# Patient Record
Sex: MALE | Race: WHITE | HISPANIC OR LATINO | Employment: STUDENT | URBAN - METROPOLITAN AREA
[De-identification: names, ages, dates, MRNs, and addresses within clinical notes are randomized per-mention and may not be internally consistent; named-entity substitution may affect disease eponyms.]

---

## 2018-07-02 ENCOUNTER — OFFICE VISIT (OUTPATIENT)
Dept: INTERNAL MEDICINE | Facility: CLINIC | Age: 23
End: 2018-07-02
Payer: COMMERCIAL

## 2018-07-02 ENCOUNTER — LAB VISIT (OUTPATIENT)
Dept: LAB | Facility: HOSPITAL | Age: 23
End: 2018-07-02
Attending: INTERNAL MEDICINE
Payer: COMMERCIAL

## 2018-07-02 VITALS
SYSTOLIC BLOOD PRESSURE: 100 MMHG | HEART RATE: 69 BPM | WEIGHT: 181.44 LBS | BODY MASS INDEX: 29.16 KG/M2 | HEIGHT: 66 IN | RESPIRATION RATE: 16 BRPM | DIASTOLIC BLOOD PRESSURE: 70 MMHG | TEMPERATURE: 99 F

## 2018-07-02 DIAGNOSIS — Z00.00 ANNUAL PHYSICAL EXAM: ICD-10-CM

## 2018-07-02 DIAGNOSIS — Z00.00 ANNUAL PHYSICAL EXAM: Primary | ICD-10-CM

## 2018-07-02 LAB
ALBUMIN SERPL BCP-MCNC: 4.5 G/DL
ALP SERPL-CCNC: 92 U/L
ALT SERPL W/O P-5'-P-CCNC: 24 U/L
ANION GAP SERPL CALC-SCNC: 9 MMOL/L
AST SERPL-CCNC: 21 U/L
BASOPHILS # BLD AUTO: 0.05 K/UL
BASOPHILS NFR BLD: 1.2 %
BILIRUB SERPL-MCNC: 1.1 MG/DL
BUN SERPL-MCNC: 7 MG/DL
CALCIUM SERPL-MCNC: 10.3 MG/DL
CHLORIDE SERPL-SCNC: 104 MMOL/L
CHOLEST SERPL-MCNC: 176 MG/DL
CHOLEST/HDLC SERPL: 4.1 {RATIO}
CO2 SERPL-SCNC: 27 MMOL/L
CREAT SERPL-MCNC: 0.8 MG/DL
DIFFERENTIAL METHOD: ABNORMAL
EOSINOPHIL # BLD AUTO: 0.5 K/UL
EOSINOPHIL NFR BLD: 11.8 %
ERYTHROCYTE [DISTWIDTH] IN BLOOD BY AUTOMATED COUNT: 12.6 %
EST. GFR  (AFRICAN AMERICAN): >60 ML/MIN/1.73 M^2
EST. GFR  (NON AFRICAN AMERICAN): >60 ML/MIN/1.73 M^2
GLUCOSE SERPL-MCNC: 80 MG/DL
HCT VFR BLD AUTO: 46 %
HDLC SERPL-MCNC: 43 MG/DL
HDLC SERPL: 24.4 %
HGB BLD-MCNC: 15.4 G/DL
IMM GRANULOCYTES # BLD AUTO: 0 K/UL
IMM GRANULOCYTES NFR BLD AUTO: 0 %
LDLC SERPL CALC-MCNC: 110.4 MG/DL
LYMPHOCYTES # BLD AUTO: 1.6 K/UL
LYMPHOCYTES NFR BLD: 39.2 %
MCH RBC QN AUTO: 28.1 PG
MCHC RBC AUTO-ENTMCNC: 33.5 G/DL
MCV RBC AUTO: 84 FL
MONOCYTES # BLD AUTO: 0.4 K/UL
MONOCYTES NFR BLD: 10 %
NEUTROPHILS # BLD AUTO: 1.5 K/UL
NEUTROPHILS NFR BLD: 37.8 %
NONHDLC SERPL-MCNC: 133 MG/DL
NRBC BLD-RTO: 0 /100 WBC
PLATELET # BLD AUTO: 178 K/UL
PMV BLD AUTO: 11.9 FL
POTASSIUM SERPL-SCNC: 4.6 MMOL/L
PROT SERPL-MCNC: 7.6 G/DL
RBC # BLD AUTO: 5.49 M/UL
SODIUM SERPL-SCNC: 140 MMOL/L
TRIGL SERPL-MCNC: 113 MG/DL
TSH SERPL DL<=0.005 MIU/L-ACNC: 0.93 UIU/ML
WBC # BLD AUTO: 4.08 K/UL

## 2018-07-02 PROCEDURE — 36415 COLL VENOUS BLD VENIPUNCTURE: CPT | Mod: PO

## 2018-07-02 PROCEDURE — 99385 PREV VISIT NEW AGE 18-39: CPT | Mod: S$GLB,,, | Performed by: INTERNAL MEDICINE

## 2018-07-02 PROCEDURE — 85025 COMPLETE CBC W/AUTO DIFF WBC: CPT

## 2018-07-02 PROCEDURE — 84443 ASSAY THYROID STIM HORMONE: CPT

## 2018-07-02 PROCEDURE — 80061 LIPID PANEL: CPT

## 2018-07-02 PROCEDURE — 80053 COMPREHEN METABOLIC PANEL: CPT

## 2018-07-02 PROCEDURE — 99999 PR PBB SHADOW E&M-NEW PATIENT-LVL III: CPT | Mod: PBBFAC,,, | Performed by: INTERNAL MEDICINE

## 2018-07-02 NOTE — PROGRESS NOTES
Subjective:       Patient ID: Victor M Henson Jr. is a 23 y.o. male.    Chief Complaint: Annual Exam    HPI     23 y.o. male here for annual exam.     Cholesterol: needs  Vaccines: Influenza - done; Tetanus - done 1 yr ago  Sexual Screening: not active  STD screening: not active  Eye exam: done last 1 yr ago  Prostate: no family history of cancer  Colonoscopy: no family history of cancer    Exercise: weight lifting - on and off.  Diet:  A lot better.  More water and eating out less.    He is going to another university, but has financial worries.  He is going to [employer].  He is studying computer science.  He is enrolled now.  He is an advanced sophomore.    History reviewed. No pertinent past medical history.  History reviewed. No pertinent surgical history.  Social History     Social History    Marital status: Single     Spouse name: N/A    Number of children: N/A    Years of education: N/A     Occupational History    Not on file.     Social History Main Topics    Smoking status: Never Smoker    Smokeless tobacco: Never Used    Alcohol use No    Drug use: No    Sexual activity: Not Currently     Other Topics Concern    Not on file     Social History Narrative    No narrative on file     Review of patient's allergies indicates:  No Known Allergies  Mr. Henson had no medications administered during this visit.    Review of Systems   Constitutional: Negative for chills, fever and unexpected weight change.   HENT: Negative for congestion, postnasal drip and sore throat.    Eyes: Negative for redness and visual disturbance.   Respiratory: Negative for cough and shortness of breath.    Cardiovascular: Negative for chest pain and palpitations.   Gastrointestinal: Negative for abdominal pain, constipation, diarrhea, nausea and vomiting.   Genitourinary: Negative for dysuria, frequency and hematuria.   Musculoskeletal: Negative for arthralgias and myalgias.   Skin: Negative for color change and rash.   Neurological:  Negative for dizziness and headaches.       Objective:      Physical Exam   Constitutional: He is oriented to person, place, and time. He appears well-developed and well-nourished.   HENT:   Head: Normocephalic and atraumatic.   Mouth/Throat: No oropharyngeal exudate.   Eyes: EOM are normal. Pupils are equal, round, and reactive to light. Right eye exhibits no discharge. Left eye exhibits no discharge. No scleral icterus.   Neck: Normal range of motion. Neck supple. No tracheal deviation present. No thyromegaly present.   Cardiovascular: Normal rate, regular rhythm and normal heart sounds.  Exam reveals no gallop and no friction rub.    No murmur heard.  Pulmonary/Chest: Effort normal and breath sounds normal. No respiratory distress. He has no wheezes. He has no rales. He exhibits no tenderness.   Abdominal: Soft. Bowel sounds are normal. He exhibits no distension and no mass. There is no tenderness. There is no rebound and no guarding.   Musculoskeletal: Normal range of motion. He exhibits no edema or tenderness.   Neurological: He is alert and oriented to person, place, and time.   Skin: Skin is warm and dry. No rash noted. No erythema. No pallor.   Psychiatric: He has a normal mood and affect. His behavior is normal.   Vitals reviewed.      Assessment:       1. Annual physical exam        Plan:       1.  Check CBC, CMP, TSH, lipids.  Discussed exercise with patient.  Up-to-date on vaccines.  No need for colon cancer or prostate cancer screening at this time.    Return to clinic in 1 year or sooner if needed.

## 2024-05-10 ENCOUNTER — HOSPITAL ENCOUNTER (EMERGENCY)
Facility: HOSPITAL | Age: 29
Discharge: HOME | End: 2024-05-10
Attending: EMERGENCY MEDICINE
Payer: MEDICAID

## 2024-05-10 VITALS
HEART RATE: 89 BPM | HEIGHT: 66 IN | SYSTOLIC BLOOD PRESSURE: 151 MMHG | TEMPERATURE: 99 F | RESPIRATION RATE: 14 BRPM | BODY MASS INDEX: 33.91 KG/M2 | OXYGEN SATURATION: 96 % | DIASTOLIC BLOOD PRESSURE: 96 MMHG | WEIGHT: 211 LBS

## 2024-05-10 DIAGNOSIS — T78.40XA ALLERGIC REACTION, INITIAL ENCOUNTER: Primary | ICD-10-CM

## 2024-05-10 PROCEDURE — 99284 EMERGENCY DEPT VISIT MOD MDM: CPT

## 2024-05-10 RX ORDER — EPINEPHRINE 0.3 MG/.3ML
1 INJECTION SUBCUTANEOUS ONCE AS NEEDED
Qty: 0.3 ML | Refills: 3 | Status: SHIPPED | OUTPATIENT
Start: 2024-05-10

## 2024-05-10 RX ORDER — EPINEPHRINE 0.3 MG/.3ML
1 INJECTION SUBCUTANEOUS ONCE
Qty: 2 EACH | Refills: 3 | Status: SHIPPED | OUTPATIENT
Start: 2024-05-10 | End: 2024-05-11

## 2024-05-10 RX ORDER — EPINEPHRINE 0.3 MG/.3ML
2 INJECTION SUBCUTANEOUS ONCE
Qty: 0.6 ML | Refills: 2 | Status: SHIPPED | OUTPATIENT
Start: 2024-05-10 | End: 2024-05-10 | Stop reason: SDUPTHER

## 2024-05-10 RX ORDER — EPINEPHRINE 0.3 MG/.3ML
2 INJECTION SUBCUTANEOUS ONCE
Qty: 0.6 ML | Refills: 0 | Status: SHIPPED | OUTPATIENT
Start: 2024-05-10 | End: 2024-05-10

## 2024-05-10 NOTE — ED PROVIDER NOTES
Encounter Date: 5/10/2024       History     Chief Complaint   Patient presents with    Allergic Reaction     Pt is allergic to spinach. He was exposed approximately 30 minutes ago and is throat tightness and chest pain. He has not taken any meds.      29-year-old male who denies any significant past medical history is presenting to the emergency department for reported allergic reaction from accidentally eating spinach today.  Patient states he has allergy to has been as it causes him to vomit and he feels some throat swelling sensation.  He did not have any nausea or vomiting today.  He did report some sensation that he was having swelling in his throat, this has since resolved.  Currently feeling improved.  States that he has never had anaphylaxis and does not have an EpiPen.  Denies urticaria, shortness of breath, wheeze.  He has otherwise in normal state of health.    The history is provided by the patient. No  was used.     Review of patient's allergies indicates:  No Known Allergies  History reviewed. No pertinent past medical history.  History reviewed. No pertinent surgical history.  Family History   Problem Relation Name Age of Onset    Diabetes Father      Cancer Neg Hx      Stroke Neg Hx      Hypertension Neg Hx      Hyperlipidemia Neg Hx      Heart disease Neg Hx       Social History     Tobacco Use    Smoking status: Never    Smokeless tobacco: Never   Substance Use Topics    Alcohol use: No    Drug use: No       Physical Exam     Initial Vitals [05/10/24 1337]   BP Pulse Resp Temp SpO2   (!) 178/114 102 15 99 °F (37.2 °C) 98 %      MAP       --         Physical Exam    Nursing note and vitals reviewed.  Constitutional: He is not diaphoretic. No distress.   HENT:   Head: Normocephalic and atraumatic.   Mouth/Throat: Oropharynx is clear and moist.   No edema of the posterior oropharynx.  Speech is clear.   Neck: Neck supple.   Normal range of motion.  Cardiovascular:  Normal rate and  regular rhythm.           Pulmonary/Chest: Breath sounds normal. No respiratory distress. He has no wheezes. He has no rhonchi. He has no rales.   Abdominal: Abdomen is soft. He exhibits no distension. There is no abdominal tenderness. There is no rebound and no guarding.   Musculoskeletal:         General: No edema.      Cervical back: Normal range of motion and neck supple.     Neurological: He is alert.   Skin: Skin is warm and dry. No rash noted.   No urticaria   Psychiatric: He has a normal mood and affect. Thought content normal.         ED Course   Procedures  Labs Reviewed - No data to display       Imaging Results    None          Medications - No data to display  Medical Decision Making  29-year-old male who denies any significant past medical history is presenting to the emergency department for reported allergic reaction from accidentally eating spinach today.  Normal airway on arrival.  No history of anaphylaxis.  Patient does not have an EpiPen.  Will discharge home with EpiPen and instructions on how to use.  Patient does not have urticaria, nausea or vomiting.  Lungs are clear.  Patient is hypertensive, this was discussed as he needs to follow up with his primary care doctor for recheck and to possibly start medications.  No evidence of anaphylaxis today.  Strict return precautions were discussed.    Risk  Prescription drug management.               ED Course as of 05/10/24 1509   Fri May 10, 2024   1500 Patient was instructed to follow up with his primary care physician regarding his elevated blood pressure today. [KL]   1501 All results were discussed with patient. Strict ED precautions and return instructions were discussed. All questions were answered. Instructed to follow up with primary care doctor for re-evaluation. Stable for discharge and outpatient follow up.   [KL]      ED Course User Index  [KL] Snehal Chung MD                     Clinical Impression:  Final diagnoses:  [T78.40XA]  Allergic reaction, initial encounter (Primary)          ED Disposition Condition    Discharge Stable          ED Prescriptions       Medication Sig Dispense Start Date End Date Auth. Provider    EPINEPHrine (EPIPEN) 0.3 mg/0.3 mL AtIn  (Status: Discontinued) Inject 0.6 mLs (0.6 mg total) into the muscle once. for 1 dose 0.6 mL 5/10/2024 5/10/2024 Snehal Chung MD    EPINEPHrine (EPIPEN) 0.3 mg/0.3 mL AtIn  (Status: Discontinued) Inject 0.6 mLs (0.6 mg total) into the muscle once. for 1 dose 0.6 mL 5/10/2024 5/10/2024 Snehal Chung MD    EPINEPHrine (EPIPEN) 0.3 mg/0.3 mL AtIn (Expires today) Inject 0.3 mLs (0.3 mg total) into the muscle once. for 1 dose 0.3 mL 5/10/2024 5/10/2024 Snehal Chung MD          Follow-up Information       Follow up With Specialties Details Why Contact [facility] - Emergency Dept Emergency Medicine Go to  As needed, If symptoms worsen [address] [address] [phone]    Isidro Reis MD Internal Medicine Schedule an appointment as soon as possible for a visit in 2 days  [address] [facility] [address]  [phone]               Snehal Chung MD  Resident  05/10/24 9406

## 2024-05-10 NOTE — DISCHARGE INSTRUCTIONS
Diagnosis: allergic reaction    Tests today showed:   Labs Reviewed - No data to display  No orders to display       Treatments you had today:   Medications - No data to display    Follow-Up Plan:  - Follow-up with primary care doctor within 3 - 5 days  - Additional testing and/or evaluation as directed by your primary doctor    Return to the Emergency Department for symptoms including but not limited to: worsening symptoms, shortness of breath or chest pain, vomiting with inability to hold down fluids, fevers greater than 100.4°F, passing out/fainting/unconsciousness, or other concerning symptoms.

## 2024-05-10 NOTE — ED NOTES
Respirations are spontaneous and unlabored. Airway is open and patent. No signs of swelling noted.

## 2024-05-10 NOTE — Clinical Note
"Victor M Song" Sixto was seen and treated in our emergency department on 5/10/2024.  He may return to work on 05/13/2024.       If you have any questions or concerns, please don't hesitate to call.      Snehal Chung MD"

## 2024-05-10 NOTE — PROVIDER PROGRESS NOTES - EMERGENCY DEPT.
Encounter Date: 5/10/2024    ED Physician Progress Notes          Physician Attestation Statement for Resident:  As the supervising MD   Physician Attestation Statement: I have personally seen and examined this patient.       I agree with the history unless otherwise noted.     As the supervising MD I agree with the PE unless otherwise noted.      I have reviewed and agree with the residents interpretation of the following unless otherwise noted:     As the supervising MD I agree with the treatment, course, plan, and disposition unless otherwise noted.

## 2024-05-10 NOTE — ED TRIAGE NOTES
Pt presents to the ED following an allergic reaction to spinach. Pt states he ate a sandwich with spinach on it which caused his throat to swell and chest to ache. Pt states that the chest aches have almost fully subsided and his throat swelling has decreased. Pt states he has never had an anaphylactic reaction and does not carry an epi-pen. Pt states that he did not have a rash with the reaction

## 2024-05-14 ENCOUNTER — TELEPHONE (OUTPATIENT)
Dept: INTERNAL MEDICINE | Facility: CLINIC | Age: 29
End: 2024-05-14
Payer: MEDICAID

## 2024-08-16 ENCOUNTER — OFFICE VISIT (OUTPATIENT)
Dept: URGENT CARE | Facility: CLINIC | Age: 29
End: 2024-08-16
Payer: COMMERCIAL

## 2024-08-16 VITALS
TEMPERATURE: 103 F | SYSTOLIC BLOOD PRESSURE: 114 MMHG | WEIGHT: 211 LBS | HEART RATE: 109 BPM | DIASTOLIC BLOOD PRESSURE: 78 MMHG | BODY MASS INDEX: 33.91 KG/M2 | HEIGHT: 66 IN | OXYGEN SATURATION: 96 % | RESPIRATION RATE: 18 BRPM

## 2024-08-16 DIAGNOSIS — R50.81 FEVER IN OTHER DISEASES: Primary | ICD-10-CM

## 2024-08-16 DIAGNOSIS — J06.9 VIRAL URI: ICD-10-CM

## 2024-08-16 DIAGNOSIS — U07.1 COVID-19 VIRUS DETECTED: ICD-10-CM

## 2024-08-16 DIAGNOSIS — U07.1 COVID: ICD-10-CM

## 2024-08-16 LAB
CTP QC/QA: YES
SARS-COV-2 AG RESP QL IA.RAPID: POSITIVE

## 2024-08-16 RX ORDER — ACETAMINOPHEN 500 MG
1000 TABLET ORAL
Status: COMPLETED | OUTPATIENT
Start: 2024-08-16 | End: 2024-08-16

## 2024-08-16 RX ORDER — PROMETHAZINE HYDROCHLORIDE AND DEXTROMETHORPHAN HYDROBROMIDE 6.25; 15 MG/5ML; MG/5ML
5 SYRUP ORAL NIGHTLY PRN
Qty: 35 ML | Refills: 0 | Status: SHIPPED | OUTPATIENT
Start: 2024-08-16

## 2024-08-16 RX ADMIN — Medication 1000 MG: at 04:08

## 2024-08-16 NOTE — PROGRESS NOTES
"Subjective:      Patient ID: Victor M Henson Jr. is a 29 y.o. male.    Vitals:  height is 5' 6" (1.676 m) and weight is 95.7 kg (211 lb). His oral temperature is 102.7 °F (39.3 °C) (abnormal). His blood pressure is 114/78 and his pulse is 109. His respiration is 18 and oxygen saturation is 96%.     Chief Complaint: Feels unwell    Patient presents c.o. fatigue. Symptoms include sore throat and nasal congestion. Symptoms began yesterday. Patient has not taken anything for his symptoms.    Fatigue  The current episode started yesterday. The problem occurs constantly. Associated symptoms include congestion, coughing (rare and not bothersome), fatigue, a sore throat and weakness. Pertinent negatives include no abdominal pain, anorexia, arthralgias, chest pain, chills, diaphoresis, fever, joint swelling, myalgias, nausea, neck pain, numbness, swollen glands, urinary symptoms, vertigo, visual change or vomiting. Nothing aggravates the symptoms. He has tried nothing for the symptoms.       Constitution: Positive for fatigue. Negative for chills, sweating and fever.   HENT:  Positive for congestion and sore throat.    Neck: Negative for neck pain.   Cardiovascular:  Negative for chest pain.   Respiratory:  Positive for cough (rare and not bothersome).    Gastrointestinal:  Negative for abdominal pain, nausea and vomiting.   Musculoskeletal:  Negative for joint pain, joint swelling and muscle ache.   Neurological:  Negative for history of vertigo and numbness.      Objective:     Vitals:    08/16/24 1623   BP: 114/78   BP Location: Left arm   Patient Position: Sitting   BP Method: Large (Automatic)   Pulse: 109   Resp: 18   Temp: (!) 102.7 °F (39.3 °C)   TempSrc: Oral   SpO2: 96%   Weight: 95.7 kg (211 lb)   Height: 5' 6" (1.676 m)      Physical Exam   Constitutional: He is oriented to person, place, and time.  Non-toxic appearance. He does not appear ill. No distress.   HENT:   Head: Atraumatic.   Ears:   Right Ear: Tympanic " membrane normal.   Left Ear: Tympanic membrane normal.   Eyes: Conjunctivae are normal.   Neck: Neck supple.   Cardiovascular: Normal rate, regular rhythm, normal heart sounds and normal pulses.   Pulmonary/Chest: Effort normal and breath sounds normal.   Neurological: no focal deficit. He is alert and oriented to person, place, and time.   Skin: Skin is not diaphoretic. Capillary refill takes less than 2 seconds.   Psychiatric: Judgment and thought content normal.     Results for orders placed or performed in visit on 08/16/24   SARS Coronavirus 2 Antigen, POCT Manual Read   Result Value Ref Range    SARS Coronavirus 2 Antigen Positive (A) Negative     Acceptable Yes       Assessment:     1. Fever in other diseases    2. Viral URI    3. COVID        Plan:       Fever in other diseases  -     SARS Coronavirus 2 Antigen, POCT Manual Read  -     acetaminophen tablet 1,000 mg    2. Viral URI  -     promethazine-dextromethorphan (PROMETHAZINE-DM) 6.25-15 mg/5 mL Syrp; Take 5 mLs by mouth nightly as needed (cough, congestion). This medication can make you feel drowsy. Take if bothersome cough.  Dispense: 35 mL; Refill: 0    3. COVID  Low risk. COVID risk score 2. Discussed indications/ side effects/ risk and benefits of Paxlovid. Patient elects for supportive care and this is reasonable.    Patient Instructions   Below are suggestions for symptomatic relief of your upper respiratory symptoms:              -Salt water gargles to soothe throat pain.              -Chloroseptic spray and Cepacol lozenges also help to numb throat pain.              -Warm herbal teas with honey/lemon/ginger can help soothe sore throat and hoarseness              -Nasal saline spray reduces inflammation and dryness.              -Warm face compresses to help with facial sinus pain/pressure.              -Humidifiers and steam can help with nasal dryness and congestion              -Vicks vapor rub at night for chest congestion.               -Flonase OTC or Nasacort OTC for nasal congestion and post-nasal drip. Ok to use twice daily for the first week, then reduce to once daily after symptoms have begun to improve.              -Afrin is a nasal spray that can give immediate relief of nasal congestion but you cannot use this medication for more than 3 days              -Simple foods like chicken noodle soup.              - Mucinex for congestion or Mucinex DM for cough during the day time. Delsym helps with coughing at night. Mucinex-D if you have sinus pressure/sinus pain or chest congestion. (caution if history of high blood pressure or palpitations). You must increase your water intake when using expectorants (Mucinex).             -Zyrtec/Claritin/Allegra/Xyzal should help with allergies.  -If you DO NOT have Hypertension or any history of palpitations, it is ok to take over the counter Sudafed or Mucinex D or Allegra-D or Claritin-D or Zyrtec-D.  -If you do take one of the above, it is ok to combine that with plain over the counter Mucinex or Allegra or Claritin or Zyrtec. If, for example, you are taking Zyrtec -D, you can combine that with Mucinex, but not Mucinex-D.  If you are taking Mucinex-D, you can combine that with plain Allegra or Claritin or Zyrtec.   -If you DO have Hypertension or palpitations, it is safe to take Coricidin HBP for relief of sinus symptoms.     Stay hydrated.    Tylenol/ ibuprofen as needed.    If you test positive for COVID-19 you may return to normal activities when, for at least 24 hours, both are true:    Your symptoms are getting better overall, and:  You have not had a fever AND are not using fever reducing medication    The CDC also recommends added precautions in the 5 days after return to normal activity including frequent hand washing, mask wearing, physical distancing.      Follow up with primary care provider in 5-7 days.    Seek immediate care in the emergency room in the event of severe abdominal  pain, chest pain, respiratory distress, fever unresponsive to antipyretic, dehydration, loss of consciousness, seizure.

## 2024-08-16 NOTE — PATIENT INSTRUCTIONS
Below are suggestions for symptomatic relief of your upper respiratory symptoms:              -Salt water gargles to soothe throat pain.              -Chloroseptic spray and Cepacol lozenges also help to numb throat pain.              -Warm herbal teas with honey/lemon/quinten can help soothe sore throat and hoarseness              -Nasal saline spray reduces inflammation and dryness.              -Warm face compresses to help with facial sinus pain/pressure.              -Humidifiers and steam can help with nasal dryness and congestion              -Vicks vapor rub at night for chest congestion.              -Flonase OTC or Nasacort OTC for nasal congestion and post-nasal drip. Ok to use twice daily for the first week, then reduce to once daily after symptoms have begun to improve.              -Afrin is a nasal spray that can give immediate relief of nasal congestion but you cannot use this medication for more than 3 days              -Simple foods like chicken noodle soup.              - Mucinex for congestion or Mucinex DM for cough during the day time. Delsym helps with coughing at night. Mucinex-D if you have sinus pressure/sinus pain or chest congestion. (caution if history of high blood pressure or palpitations). You must increase your water intake when using expectorants (Mucinex).             -Zyrtec/Claritin/Allegra/Xyzal should help with allergies.  -If you DO NOT have Hypertension or any history of palpitations, it is ok to take over the counter Sudafed or Mucinex D or Allegra-D or Claritin-D or Zyrtec-D.  -If you do take one of the above, it is ok to combine that with plain over the counter Mucinex or Allegra or Claritin or Zyrtec. If, for example, you are taking Zyrtec -D, you can combine that with Mucinex, but not Mucinex-D.  If you are taking Mucinex-D, you can combine that with plain Allegra or Claritin or Zyrtec.   -If you DO have Hypertension or palpitations, it is safe to take Coricidin HBP for  relief of sinus symptoms.     Stay hydrated.    Tylenol/ ibuprofen as needed.    If you test positive for COVID-19 you may return to normal activities when, for at least 24 hours, both are true:    Your symptoms are getting better overall, and:  You have not had a fever AND are not using fever reducing medication    The CDC also recommends added precautions in the 5 days after return to normal activity including frequent hand washing, mask wearing, physical distancing.      Follow up with primary care provider in 5-7 days.    Seek immediate care in the emergency room in the event of severe abdominal pain, chest pain, respiratory distress, fever unresponsive to antipyretic, dehydration, loss of consciousness, seizure.

## 2024-08-16 NOTE — LETTER
August 16, 2024      [facility] [address] [place] [facility] [address]  Phone: [phone]  Fax: [phone]       Patient: Victor M Henson   YOB: 1995  Date of Visit: 08/16/2024    To Whom It May Concern:    Tino Henson  was at [facility] on 08/16/2024 with COVID and associated symptoms. The patient may return to work/school on 8/21/24 with no restrictions. If you have any questions or concerns, or if I can be of further assistance, please do not hesitate to contact me.    Sincerely,     Fabiola Sprague MD

## 2024-10-10 ENCOUNTER — HOSPITAL ENCOUNTER (EMERGENCY)
Facility: HOSPITAL | Age: 29
Discharge: HOME | End: 2024-10-10
Attending: EMERGENCY MEDICINE
Payer: COMMERCIAL

## 2024-10-10 VITALS
SYSTOLIC BLOOD PRESSURE: 136 MMHG | DIASTOLIC BLOOD PRESSURE: 80 MMHG | BODY MASS INDEX: 33.91 KG/M2 | OXYGEN SATURATION: 95 % | WEIGHT: 211 LBS | RESPIRATION RATE: 18 BRPM | TEMPERATURE: 98 F | HEIGHT: 66 IN | HEART RATE: 88 BPM

## 2024-10-10 DIAGNOSIS — T78.40XA ALLERGIC REACTION, INITIAL ENCOUNTER: Primary | ICD-10-CM

## 2024-10-10 DIAGNOSIS — T78.1XXA ALLERGIC REACTION TO FOOD, INITIAL ENCOUNTER: ICD-10-CM

## 2024-10-10 LAB
HCV AB SERPL QL IA: NORMAL
HIV 1+2 AB+HIV1 P24 AG SERPL QL IA: NORMAL

## 2024-10-10 PROCEDURE — 25000003 PHARM REV CODE 250: Performed by: NURSE PRACTITIONER

## 2024-10-10 PROCEDURE — 96375 TX/PRO/DX INJ NEW DRUG ADDON: CPT

## 2024-10-10 PROCEDURE — 96374 THER/PROPH/DIAG INJ IV PUSH: CPT

## 2024-10-10 PROCEDURE — 94761 N-INVAS EAR/PLS OXIMETRY MLT: CPT

## 2024-10-10 PROCEDURE — 87389 HIV-1 AG W/HIV-1&-2 AB AG IA: CPT | Performed by: PHYSICIAN ASSISTANT

## 2024-10-10 PROCEDURE — 63600175 PHARM REV CODE 636 W HCPCS: Performed by: NURSE PRACTITIONER

## 2024-10-10 PROCEDURE — 99284 EMERGENCY DEPT VISIT MOD MDM: CPT | Mod: 25

## 2024-10-10 PROCEDURE — 86803 HEPATITIS C AB TEST: CPT | Performed by: PHYSICIAN ASSISTANT

## 2024-10-10 RX ORDER — METHYLPREDNISOLONE 4 MG/1
TABLET ORAL
Qty: 21 EACH | Refills: 0 | Status: SHIPPED | OUTPATIENT
Start: 2024-10-10

## 2024-10-10 RX ORDER — FAMOTIDINE 10 MG/ML
20 INJECTION INTRAVENOUS
Status: COMPLETED | OUTPATIENT
Start: 2024-10-10 | End: 2024-10-10

## 2024-10-10 RX ORDER — METHYLPREDNISOLONE SOD SUCC 125 MG
125 VIAL (EA) INJECTION
Status: COMPLETED | OUTPATIENT
Start: 2024-10-10 | End: 2024-10-10

## 2024-10-10 RX ORDER — DIPHENHYDRAMINE HYDROCHLORIDE 50 MG/ML
25 INJECTION INTRAMUSCULAR; INTRAVENOUS
Status: COMPLETED | OUTPATIENT
Start: 2024-10-10 | End: 2024-10-10

## 2024-10-10 RX ADMIN — FAMOTIDINE 20 MG: 10 INJECTION INTRAVENOUS at 02:10

## 2024-10-10 RX ADMIN — DIPHENHYDRAMINE HYDROCHLORIDE 25 MG: 50 INJECTION, SOLUTION INTRAMUSCULAR; INTRAVENOUS at 02:10

## 2024-10-10 RX ADMIN — METHYLPREDNISOLONE SODIUM SUCCINATE 125 MG: 125 INJECTION, POWDER, FOR SOLUTION INTRAMUSCULAR; INTRAVENOUS at 02:10

## 2024-10-10 NOTE — ED PROVIDER NOTES
Encounter Date: 10/10/2024       History     Chief Complaint   Patient presents with    Allergic Reaction     States new allergy to spinach and just accidentally ate some a few minutes ago, now feeling tightness in throat and nauseous. No distress noted currently, airway intact     29 y.o. male no significant PMH who presents with complaint of allergic reaction. 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 Today during lunch around 12:30 he was eating a wrap and started to feel a scratch in his throat. He then noticed the wrap had spinach in it. He did not have his epi pen with him, so he came to the ED. States that he had some swelling of the throat and redness of his face, as well as mild nausea. However he never felt like he had trouble breathing. He says this reaction is similar to the previous reactions he has had. He is now at bedside and his nausea has subsided. He says he has a slight scratch in his throat, but is not having any trouble breathing and is saturating well with all VSS.    The history is provided by the patient and medical records. No  was used.     Review of patient's allergies indicates:  No Known Allergies  No past medical history on file.  No past surgical history on file.  Family History   Problem Relation Name Age of Onset    Diabetes Father      Cancer Neg Hx      Stroke Neg Hx      Hypertension Neg Hx      Hyperlipidemia Neg Hx      Heart disease Neg Hx       Social History     Tobacco Use    Smoking status: Never    Smokeless tobacco: Never   Substance Use Topics    Alcohol use: No    Drug use: No         Physical Exam     Initial Vitals [10/10/24 1302]   BP Pulse Resp Temp SpO2   (!) 186/109 (!) 116 20 98.6 °F (37 °C) 98 %      MAP       --         Physical Exam    Nursing note and vitals reviewed.  HENT: Mouth/Throat: Uvula is midline. No posterior oropharyngeal edema or posterior oropharyngeal erythema.   Mild redness and swelling of  the cheeks and face   Eyes: EOM are normal. Pupils are equal, round, and reactive to light.   Neck: Neck supple.   Cardiovascular:  Normal rate and regular rhythm.           Pulmonary/Chest: Breath sounds normal. No stridor. No respiratory distress. He has no wheezes.   Abdominal: Bowel sounds are normal.   Musculoskeletal:      Cervical back: Neck supple.     Neurological: He is oriented to person, place, and time.   Skin: Skin is warm and dry. Capillary refill takes less than 2 seconds. No rash noted.   Psychiatric: He has a normal mood and affect. Thought content normal.         ED Course   Procedures  Labs Reviewed   HIV 1 / 2 ANTIBODY   HEPATITIS C ANTIBODY          Imaging Results    None          Medications   diphenhydrAMINE injection 25 mg (has no administration in time range)   methylPREDNISolone sodium succinate injection 125 mg (has no administration in time range)   famotidine (PF) injection 20 mg (has no administration in time range)     Medical Decision Making  29 y.o. male no significant PMH who presents with complaint of swollen scratchy throat and N/V. He has a known allergy to spinach and came into contact w/ spinach at lunch. Although he does have an epi-pen at home, he did not bring it with him today. He did not ever have any trouble breathing or protecting his airway, urticaria, or desaturation.    Differential including, but not limited to:  Allergic reaction, anaphylaxis, dermatitis, angioedema      Upon arrival to ED he received Famotidine, benadryl, and methylprednisolone 125. After 2 hours in the ED he was still HDS with no concern for anaphylaxis. Since he never received epinephrine there was no concern for bi-phasic response. He was determined to be stable for d/c. Sent home with Medrol dose pack to finish at home, as well as a reminder to keep his epi-pen with him in case he were to have any similar reaction in the future.    Amount and/or Complexity of Data Reviewed  External Data  Reviewed: notes.     Details: Prior ED visit with reaction after eating spinach.     Risk  Prescription drug management.              Attending Attestation:   Physician Attestation Statement for Resident:  As the supervising MD   Physician Attestation Statement: I have personally seen and examined this patient.   I agree with the above history.  -: Emergent evaluation of a 28 yo male presenting with concern for allergic reaction.  Patient reports an allergy to spinach and accidentally ate a wrap that he believes had spinach on it.  He felt scratching in his throat and his face became red around his mouth.  + Nausea.  Denies SOB.    As the supervising MD I agree with the above PE.   -: No distress, speaking complete sentences  No wheezing  No posterior oropharyngeal edema, uvula midline, no hoarse voice or stridor  RRR  No urticarial rash  Abdomen soft, non-tender    As the supervising MD I agree with the above treatment, course, plan, and disposition.   -: Ordered for steroid, benedaryl, pepcid by teletriage provider.   No airway compromise.   Feeling improved since sx onset.   Do not believe epi indicated at this time.  Has an epi pen at home.   Signed out to oncoming attending  Sessions at 3p pending reassessment for sx improvement.  If he remained improved, anticipate d/c with medrol dose pack, can take benadryl PRN for itching.   Advised close PCP f/u this week.       I have reviewed the following: old records at this facility.                                       Clinical Impression:  Final diagnoses:  [T78.40XA] Allergic reaction, initial encounter (Primary)  [T78.1XXA] Allergic reaction to food, initial encounter                 Good Branham MD  Resident  10/10/24 1616       Philip Barbour MD  10/10/24 [id number]

## 2024-10-10 NOTE — ED NOTES
Assumed care of the patient. Pt on continuous cardiac monitoring, continuous pulse oximetry, and automatic BP cuff cycling Q15min. Pt in hospital gown, side rails up X2, bed low and locked, and call light is placed within reach. No family/visitors at bedside at this time. Pt denies any complaints or needs.

## 2024-10-10 NOTE — FIRST PROVIDER EVALUATION
Emergency Department TeleTriage Encounter Note      CHIEF COMPLAINT    Chief Complaint   Patient presents with    Allergic Reaction     States new allergy to spinach and just accidentally ate some a few minutes ago, now feeling tightness in throat and nauseous. No distress noted currently, airway intact       VITAL SIGNS   Initial Vitals [10/10/24 1302]   BP Pulse Resp Temp SpO2   (!) 186/109 (!) 116 20 98.6 °F (37 °C) 98 %      MAP       --            ALLERGIES    Review of patient's allergies indicates:  No Known Allergies    PROVIDER TRIAGE NOTE  Verbal consent for the teletriage evaluation was given by the patient at the start of the evaluation.  All efforts will be made to maintain patient's privacy during the evaluation.      This is a teletriage evaluation of a 29 y.o. male presenting to the ED with c/o possible allergic reaction to spinach today.  Reports symptoms have subsided at present.  No distress, tolerating oral secretions in TT. Limited physical exam via telehealth: The patient is awake, alert, answering questions appropriately and is not in respiratory distress.  As the Teletriage provider, I performed an initial assessment and ordered appropriate labs and imaging studies, if any, to facilitate the patient's care once placed in the ED. Once a room is available, care and a full evaluation will be completed by an alternate ED provider.  Any additional orders and the final disposition will be determined by that provider.  All imaging and labs will not be followed-up by the Teletriage Team, including myself.          ORDERS  Labs Reviewed   HIV 1 / 2 ANTIBODY   HEPATITIS C ANTIBODY       ED Orders (720h ago, onward)      Start Ordered     Status Ordering Provider    10/10/24 1345 10/10/24 1343  diphenhydrAMINE injection 25 mg  ED 1 Time         Ordered JIMMY LAU    10/10/24 1345 10/10/24 1343  methylPREDNISolone sodium succinate injection 125 mg  ED 1 Time         Ordered JIMMY LAU    10/10/24  1345 10/10/24 1343  famotidine (PF) injection 20 mg  ED 1 Time         Ordered JIMMY LAU    10/10/24 1344 10/10/24 1343  Saline lock IV  Once         Ordered JIMMY LAU    10/10/24 1305 10/10/24 1304  HIV 1/2 Ag/Ab (4th Gen)  STAT         Ordered TOÑITO SOLIS    10/10/24 1305 10/10/24 1304  Hepatitis C Antibody  STAT         Ordered TOÑITO SOLIS              Virtual Visit Note: The provider triage portion of this emergency department evaluation and documentation was performed via Rescue[employer], a HIPAA-compliant telemedicine application, in concert with a tele-presenter in the room. A face to face patient evaluation with one of my colleagues will occur once the patient is placed in an emergency department room.      DISCLAIMER: This note was prepared with [employer] voice recognition transcription software. Garbled syntax, mangled pronouns, and other bizarre constructions may be attributed to that software system.

## 2024-10-10 NOTE — ED TRIAGE NOTES
"Victor M Henson JrAurora, a 29 y.o. male presents to the ED w/ complaint of allergic reaction    Patient states having an allergic reaction to spinach. Denies chest pain and SOB. Denies nausea at this time. Endorses vomiting today. States, "I got really hot all over my body."    Triage note:  Chief Complaint   Patient presents with    Allergic Reaction     States new allergy to spinach and just accidentally ate some a few minutes ago, now feeling tightness in throat and nauseous. No distress noted currently, airway intact     Review of patient's allergies indicates:  No Known Allergies  No past medical history on file.    "

## 2024-10-14 LAB
OHS QRS DURATION: 74 MS
OHS QTC CALCULATION: 410 MS

## 2024-12-03 ENCOUNTER — OFFICE VISIT (OUTPATIENT)
Dept: INTERNAL MEDICINE | Facility: CLINIC | Age: 29
End: 2024-12-03
Payer: COMMERCIAL

## 2024-12-03 VITALS
HEART RATE: 99 BPM | SYSTOLIC BLOOD PRESSURE: 148 MMHG | HEIGHT: 66 IN | DIASTOLIC BLOOD PRESSURE: 92 MMHG | TEMPERATURE: 98 F | WEIGHT: 218.25 LBS | BODY MASS INDEX: 35.08 KG/M2 | OXYGEN SATURATION: 98 %

## 2024-12-03 DIAGNOSIS — Z00.00 ANNUAL PHYSICAL EXAM: Primary | ICD-10-CM

## 2024-12-03 DIAGNOSIS — R21 RASH: ICD-10-CM

## 2024-12-03 DIAGNOSIS — R03.0 ELEVATED BLOOD PRESSURE READING: ICD-10-CM

## 2024-12-03 PROCEDURE — 1159F MED LIST DOCD IN RCRD: CPT | Mod: CPTII,S$GLB,, | Performed by: INTERNAL MEDICINE

## 2024-12-03 PROCEDURE — 99385 PREV VISIT NEW AGE 18-39: CPT | Mod: S$GLB,,, | Performed by: INTERNAL MEDICINE

## 2024-12-03 PROCEDURE — 3080F DIAST BP >= 90 MM HG: CPT | Mod: CPTII,S$GLB,, | Performed by: INTERNAL MEDICINE

## 2024-12-03 PROCEDURE — 3077F SYST BP >= 140 MM HG: CPT | Mod: CPTII,S$GLB,, | Performed by: INTERNAL MEDICINE

## 2024-12-03 PROCEDURE — 1160F RVW MEDS BY RX/DR IN RCRD: CPT | Mod: CPTII,S$GLB,, | Performed by: INTERNAL MEDICINE

## 2024-12-03 PROCEDURE — 99999 PR PBB SHADOW E&M-EST. PATIENT-LVL III: CPT | Mod: PBBFAC,,, | Performed by: INTERNAL MEDICINE

## 2024-12-03 PROCEDURE — 3008F BODY MASS INDEX DOCD: CPT | Mod: CPTII,S$GLB,, | Performed by: INTERNAL MEDICINE

## 2024-12-03 NOTE — PROGRESS NOTES
Assessment:       1. Annual physical exam  - CBC Auto Differential; Future  - Comprehensive Metabolic Panel; Future  - TSH; Future  - Lipid Panel; Future    2. Rash        Plan:       1. Check CBC, CMP, TSH, lipids.  Discussed diet and exercise.  Discussed vaccines.  2. Use shampoo as discussed for forehead.  3. RTC 3 months after lifestyle changes to reassess.    Assessment & Plan    IMPRESSION:  1. Assessed elevated blood pressure, opting to defer medication initiation  2. Evaluated skin condition on forehead, likely related to prior dandruff/dry skin issues  3. Considered patient's recent family losses and stress as potential contributors to health status    ESSENTIAL HYPERTENSION:   Explained impact of regular exercise on blood pressure management.   Mr. Henson to exercise at least 5 days a week, 30 minutes per day minimum.   Recommend reducing salt intake.   Recommend increasing consumption of fresh fruits and vegetables.   Follow up in 3 months to reassess blood pressure.   If blood pressure remains elevated at follow-up, will discuss medication options.    SEBORRHEIC DERMATITIS:   Mr. Henson to resume use of sulfur 8 shampoo on forehead to address skin condition.    GENERAL ADULT MEDICAL EXAMINATION:   CBC, comprehensive metabolic panel, TSH, cholesterol panel, and glucose ordered.    DIETARY COUNSELING:   Educated on recommended daily sodium intake (2,000 mg) compared to average [place] consumption (7,000-8,000 mg).    EXERCISE COUNSELING:   Mr. Henson to exercise at least 5 days a week, 30 minutes per day minimum.    MEDICATION MANAGEMENT:   Refill available until May next year for Epipen.                   This note was generated with the assistance of ambient listening technology. Verbal consent was obtained by the patient and accompanying visitor(s) for the recording of patient appointment to facilitate this note. I attest to having reviewed and edited the generated note for accuracy, though some  syntax or spelling errors may persist. Please contact the author of this note for any clarification.       Subjective:       Patient ID: Victor M Henson Jr. is a 29 y.o. male.    Chief Complaint: Annual Exam    HPI    29 y.o. male here for annual exam.     Cholesterol: needs  Vaccines: Influenza - done; Tetanus - 2018; COVID - 3 done  Sexual Screening: not active  STD screening: not active  Eye exam: UTD  Prostate: no family history of cancer  Colonoscopy: no family history of cancer     Exercise: needs to go back to the gym.  Does walk around a lot.  Diet:  Home cooked    History of Present Illness    CHIEF COMPLAINT:  Mr. Henson presents today for an annual exam.    CARDIOVASCULAR:  His blood pressure is high. He does not have a blood pressure cuff at home and attributes the elevated readings to life stressors.    LIFESTYLE AND SOCIAL HISTORY:  His diet consists of more home-cooked meals. He walks frequently but acknowledges the need to return to regular gym attendance. He occasionally uses the gym at work. He denies alcohol consumption, smoking, or drug use. He is not sexually active and is currently caring for his grandmother.    FAMILY HISTORY:  He reports no family history of prostate or colon cancer. His father, uncle, and aunt have passed away.    DERMATOLOGICAL:  He reports experiencing redness and dryness above the scalp for several months to a year. He notes that using Sulfur 8 shampoo alleviates these symptoms.    IMMUNIZATIONS:  He received his last tetanus vaccine in 2019 and confirms having received a flu vaccine.    MEDICATIONS:  He takes Ibuprofen as needed.      ROS:  Constitutional: -change in weight  Integumentary: +dry skin         Review of Systems          Objective:      Physical Exam  Vitals reviewed.   Constitutional:       Appearance: He is well-developed.   HENT:      Head: Normocephalic and atraumatic.      Mouth/Throat:      Pharynx: No oropharyngeal exudate.   Eyes:      General: No  scleral icterus.        Right eye: No discharge.         Left eye: No discharge.      Pupils: Pupils are equal, round, and reactive to light.   Neck:      Thyroid: No thyromegaly.      Trachea: No tracheal deviation.   Cardiovascular:      Rate and Rhythm: Normal rate and regular rhythm.      Heart sounds: Normal heart sounds. No murmur heard.     No friction rub. No gallop.   Pulmonary:      Effort: Pulmonary effort is normal. No respiratory distress.      Breath sounds: Normal breath sounds. No wheezing or rales.   Chest:      Chest wall: No tenderness.   Abdominal:      General: Bowel sounds are normal. There is no distension.      Palpations: Abdomen is soft. There is no mass.      Tenderness: There is no abdominal tenderness. There is no guarding or rebound.   Musculoskeletal:         General: No tenderness. Normal range of motion.      Cervical back: Normal range of motion and neck supple.   Skin:     General: Skin is warm and dry.      Coloration: Skin is not pale.      Findings: No erythema or rash.   Neurological:      Mental Status: He is alert and oriented to person, place, and time.   Psychiatric:         Behavior: Behavior normal.

## 2025-03-24 ENCOUNTER — PATIENT MESSAGE (OUTPATIENT)
Dept: INTERNAL MEDICINE | Facility: CLINIC | Age: 30
End: 2025-03-24
Payer: COMMERCIAL

## 2025-06-19 ENCOUNTER — OFFICE VISIT (OUTPATIENT)
Dept: URGENT CARE | Facility: CLINIC | Age: 30
End: 2025-06-19
Payer: COMMERCIAL

## 2025-06-19 VITALS
TEMPERATURE: 99 F | BODY MASS INDEX: 35.12 KG/M2 | WEIGHT: 218.5 LBS | DIASTOLIC BLOOD PRESSURE: 83 MMHG | HEART RATE: 90 BPM | SYSTOLIC BLOOD PRESSURE: 132 MMHG | RESPIRATION RATE: 17 BRPM | HEIGHT: 66 IN | OXYGEN SATURATION: 98 %

## 2025-06-19 DIAGNOSIS — R10.9 ABDOMINAL CRAMPING: Primary | ICD-10-CM

## 2025-06-19 DIAGNOSIS — R19.7 DIARRHEA OF PRESUMED INFECTIOUS ORIGIN: ICD-10-CM

## 2025-06-19 LAB
BILIRUBIN, UA POC OHS: NEGATIVE
BLOOD, UA POC OHS: ABNORMAL
CLARITY, UA POC OHS: ABNORMAL
COLOR, UA POC OHS: YELLOW
GLUCOSE, UA POC OHS: NEGATIVE
KETONES, UA POC OHS: NEGATIVE
LEUKOCYTES, UA POC OHS: NEGATIVE
NITRITE, UA POC OHS: NEGATIVE
PH, UA POC OHS: 6.5
PROTEIN, UA POC OHS: NEGATIVE
SPECIFIC GRAVITY, UA POC OHS: 1.01
UROBILINOGEN, UA POC OHS: 0.2

## 2025-06-19 PROCEDURE — 81003 URINALYSIS AUTO W/O SCOPE: CPT | Mod: QW,S$GLB,, | Performed by: FAMILY MEDICINE

## 2025-06-19 PROCEDURE — 99214 OFFICE O/P EST MOD 30 MIN: CPT | Mod: S$GLB,,, | Performed by: FAMILY MEDICINE

## 2025-06-19 RX ORDER — DICYCLOMINE HYDROCHLORIDE 20 MG/1
20 TABLET ORAL EVERY 6 HOURS
Qty: 28 TABLET | Refills: 0 | Status: SHIPPED | OUTPATIENT
Start: 2025-06-19

## 2025-06-19 NOTE — ADDENDUM NOTE
Addended by: CHRISTIAN WARD on: 6/19/2025 04:45 PM     Modules accepted: Orders, Level of Service

## 2025-06-19 NOTE — PROGRESS NOTES
"Subjective:      Patient ID: Victor M Henson Jr. is a 30 y.o. male.    Vitals:  height is 5' 6" (1.676 m) and weight is 99.1 kg (218 lb 7.6 oz). His oral temperature is 98.7 °F (37.1 °C). His blood pressure is 132/83 and his pulse is 90. His respiration is 17 and oxygen saturation is 98%.     Chief Complaint: Abdominal Cramping    Pt presents today w/ abdominal cramping ; onset approx 3x days ago. Pt c/o flatus , diarrhea and flatus. Pt denies fever , emesis , flank px , sob and urinary sx. Pt states he suspects food poisoning because his brother had similar sx and ate the same food a day prior . Pt self medicated w/ pepto bismol and imodium;mild relief.      Abdominal Cramping  This is a new problem. The current episode started in the past 7 days. The onset quality is sudden. The problem occurs constantly. The problem has been unchanged. The pain is located in the generalized abdominal region. The pain is at a severity of 5/10. The pain is moderate. The quality of the pain is cramping. The abdominal pain does not radiate. Associated symptoms include belching, diarrhea and flatus. Pertinent negatives include no anorexia, arthralgias, constipation, dysuria, fever, frequency, headaches, hematochezia, hematuria, melena, myalgias, nausea, vomiting or weight loss. Nothing aggravates the pain. The pain is relieved by Nothing. Treatments tried: pepto bismol and immodium. The treatment provided mild relief. There is no history of abdominal surgery, colon cancer, Crohn's disease, gallstones, GERD, irritable bowel syndrome, pancreatitis, PUD or ulcerative colitis. Patient's medical history does not include kidney stones and UTI.       Constitution: Negative for fever.   Gastrointestinal:  Positive for diarrhea. Negative for history of abdominal surgery, nausea, vomiting, constipation and bright red blood in stool.   Genitourinary:  Negative for dysuria, frequency and hematuria.   Musculoskeletal:  Negative for joint pain and " "muscle ache.   Neurological:  Negative for headaches.      Objective:     Vitals:    06/19/25 1538   BP: 132/83   BP Location: Left arm   Patient Position: Sitting   Pulse: 90   Resp: 17   Temp: 98.7 °F (37.1 °C)   TempSrc: Oral   SpO2: 98%   Weight: 99.1 kg (218 lb 7.6 oz)   Height: 5' 6" (1.676 m)      Physical Exam   Constitutional: He is oriented to person, place, and time.  Non-toxic appearance. He does not appear ill. No distress.   HENT:   Head: Atraumatic.   Eyes: Conjunctivae are normal.   Cardiovascular: Normal rate.   Pulmonary/Chest: Effort normal.   Abdominal: Bowel sounds are normal. He exhibits no distension and no mass. Soft. There is no abdominal tenderness. There is no rebound and no guarding.   Neurological: He is alert and oriented to person, place, and time.   Skin: Skin is not diaphoretic. Capillary refill takes less than 2 seconds.   Psychiatric: Judgment and thought content normal.       Assessment:     1. Abdominal cramping    2. Diarrhea of presumed infectious origin        Plan:       Abdominal cramping  -     POCT Urinalysis(Instrument)  -     Ambulatory referral/consult to Gastroenterology  -     dicyclomine (BENTYL) 20 mg tablet; Take 1 tablet (20 mg total) by mouth every 6 (six) hours.  Dispense: 28 tablet; Refill: 0  Benign abdominal exam. No alarm features.    2. Diarrhea of presumed infectious origin  -     Ambulatory referral/consult to Gastroenterology    I have reviewed labs from 2018 to present.    Patient Instructions   Declined stool testing  I have placed GI referral in the event of persistent symptoms  Call to schedule an appointment: [phone]       Addendum at the end of discharge: stool culture ordered per patient's request.              "

## 2025-06-19 NOTE — PATIENT INSTRUCTIONS
Declined stool testing  I have placed GI referral in the event of persistent symptoms  Call to schedule an appointment: [phone]     ED MD